# Patient Record
Sex: FEMALE | Race: WHITE
[De-identification: names, ages, dates, MRNs, and addresses within clinical notes are randomized per-mention and may not be internally consistent; named-entity substitution may affect disease eponyms.]

---

## 2023-01-23 ENCOUNTER — HOSPITAL ENCOUNTER (EMERGENCY)
Dept: HOSPITAL 12 - ER | Age: 23
LOS: 1 days | Discharge: HOME | End: 2023-01-24
Payer: COMMERCIAL

## 2023-01-23 VITALS — WEIGHT: 170 LBS | BODY MASS INDEX: 27.32 KG/M2 | HEIGHT: 66 IN
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
## 2023-01-23 DIAGNOSIS — S23.3XXA: ICD-10-CM

## 2023-01-23 DIAGNOSIS — S13.9XXA: Primary | ICD-10-CM

## 2023-01-23 LAB — HCG UR QL: NEGATIVE

## 2023-01-23 PROCEDURE — 84703 CHORIONIC GONADOTROPIN ASSAY: CPT

## 2023-01-23 PROCEDURE — 99284 EMERGENCY DEPT VISIT MOD MDM: CPT

## 2023-01-23 PROCEDURE — 72080 X-RAY EXAM THORACOLMB 2/> VW: CPT

## 2023-01-23 PROCEDURE — 96372 THER/PROPH/DIAG INJ SC/IM: CPT

## 2023-01-23 PROCEDURE — 36415 COLL VENOUS BLD VENIPUNCTURE: CPT

## 2023-01-23 PROCEDURE — 84702 CHORIONIC GONADOTROPIN TEST: CPT

## 2023-01-23 NOTE — NUR
PT refused toradol 60 mg order at this time until Pregnancy test serum quant 
results come back.  Urine pregnancy test is negative.

## 2023-01-23 NOTE — NUR
After being triaged, patient was placed back in the waiting room due to no beds 
available in the ER at this time.

## 2023-01-24 VITALS — DIASTOLIC BLOOD PRESSURE: 68 MMHG | SYSTOLIC BLOOD PRESSURE: 131 MMHG

## 2023-01-24 NOTE — NUR
Patient discharged to home in stable condition with boyfriend. A/O x 4. NAD 
noted. Ambulatory with a steady gait. All belongings with patient. Written and 
verbal after care instructions given. 

Patient verbalizes understanding of instructions. Stressed follow up or return 
to ER for worsening s/s.